# Patient Record
Sex: MALE | Race: OTHER | NOT HISPANIC OR LATINO | URBAN - METROPOLITAN AREA
[De-identification: names, ages, dates, MRNs, and addresses within clinical notes are randomized per-mention and may not be internally consistent; named-entity substitution may affect disease eponyms.]

---

## 2017-01-03 ENCOUNTER — OUTPATIENT (OUTPATIENT)
Dept: OUTPATIENT SERVICES | Facility: HOSPITAL | Age: 57
LOS: 1 days | End: 2017-01-03
Payer: MEDICAID

## 2017-01-03 VITALS
DIASTOLIC BLOOD PRESSURE: 87 MMHG | HEART RATE: 73 BPM | TEMPERATURE: 98 F | HEIGHT: 63 IN | RESPIRATION RATE: 16 BRPM | WEIGHT: 125 LBS | OXYGEN SATURATION: 99 % | SYSTOLIC BLOOD PRESSURE: 157 MMHG

## 2017-01-03 DIAGNOSIS — N43.3 HYDROCELE, UNSPECIFIED: ICD-10-CM

## 2017-01-03 DIAGNOSIS — F25.0 SCHIZOAFFECTIVE DISORDER, BIPOLAR TYPE: ICD-10-CM

## 2017-01-03 DIAGNOSIS — I10 ESSENTIAL (PRIMARY) HYPERTENSION: ICD-10-CM

## 2017-01-03 DIAGNOSIS — Z98.890 OTHER SPECIFIED POSTPROCEDURAL STATES: Chronic | ICD-10-CM

## 2017-01-03 DIAGNOSIS — Z01.818 ENCOUNTER FOR OTHER PREPROCEDURAL EXAMINATION: ICD-10-CM

## 2017-01-03 PROCEDURE — 86901 BLOOD TYPING SEROLOGIC RH(D): CPT

## 2017-01-03 PROCEDURE — 86900 BLOOD TYPING SEROLOGIC ABO: CPT

## 2017-01-03 PROCEDURE — 71046 X-RAY EXAM CHEST 2 VIEWS: CPT

## 2017-01-03 PROCEDURE — G0463: CPT

## 2017-01-03 PROCEDURE — 71020: CPT | Mod: 26

## 2017-01-03 PROCEDURE — 86850 RBC ANTIBODY SCREEN: CPT

## 2017-01-03 NOTE — H&P PST ADULT - FAMILY HISTORY
Mother  Still living? Unknown  Family history of cancer, Age at diagnosis: Age Unknown     Sibling  Still living? Yes, Estimated age: Age Unknown  Family history of cancer, Age at diagnosis: Age Unknown  Family history of breast cancer in sister, Age at diagnosis: Age Unknown

## 2017-01-03 NOTE — H&P PST ADULT - NEGATIVE CARDIOVASCULAR SYMPTOMS
no paroxysmal nocturnal dyspnea/no chest pain/no orthopnea/no palpitations/no peripheral edema/no dyspnea on exertion/no claudication

## 2017-01-03 NOTE — H&P PST ADULT - RS GEN PE MLT RESP DETAILS PC
no subcutaneous emphysema/no wheezes/clear to auscultation bilaterally/no rales/good air movement/normal/breath sounds equal/airway patent/respirations non-labored/no rhonchi/no intercostal retractions/no chest wall tenderness

## 2017-01-03 NOTE — H&P PST ADULT - PROBLEM SELECTOR PLAN 2
Instructed to take antihypertensive medications as prescribed with a sip of water the morning of surgery and follow up with PCP/Specialists post surgery. Agrees with plan of care

## 2017-01-03 NOTE — H&P PST ADULT - NSANTHOSAYNRD_GEN_A_CORE
No. KARINE screening performed.  STOP BANG Legend: 0-2 = LOW Risk; 3-4 = INTERMEDIATE Risk; 5-8 = HIGH Risk

## 2017-01-03 NOTE — H&P PST ADULT - HISTORY OF PRESENT ILLNESS
55 y/o male presents to PST with complains of right testicular swelling for 23 years and worsening over the past year. Patient was diagnosed with unspecified hydrocele and is scheduled for right hydrocelectomy 1/9/2017

## 2017-01-03 NOTE — H&P PST ADULT - PROBLEM SELECTOR PLAN 3
Instructed to take psyche medications the morning of surgery as prescribed with a sip of water and to follow up with psyche post surgery. Verbalized understanding of instructions

## 2017-01-03 NOTE — H&P PST ADULT - NEGATIVE GENERAL SYMPTOMS
no weight gain/no polyuria/no polydipsia/no chills/no fever/no malaise/no polyphagia/no weight loss/no sweating/no anorexia

## 2017-01-03 NOTE — H&P PST ADULT - GASTROINTESTINAL DETAILS
bowel sounds normal/no guarding/no distention/no organomegaly/soft/no bruit/no rebound tenderness/normal/no rigidity/no masses palpable/nontender

## 2017-01-03 NOTE — H&P PST ADULT - ASSESSMENT
57 y/o male with PMH of essential hypertension, leukocytosis, high risk for KARINE per STOP BANG screening test, tobacco use, mixed hyperlipidemia, schizoaffective bipolar disorder stable on medication, and unspecified hydrocele scheduled for right hydrocelectomy 1/9/2017. Patient is at moderate risk for planned surgery

## 2017-01-03 NOTE — H&P PST ADULT - NEGATIVE RESPIRATORY AND THORAX SYMPTOMS
no cough/no hemoptysis/no dyspnea/no pleuritic chest pain/no wheezing no cough/no pleuritic chest pain/no wheezing/no hemoptysis

## 2017-01-03 NOTE — H&P PST ADULT - PMH
Essential (primary) hypertension    Hepatitis C virus infection without hepatic coma, unspecified chronicity  treatment completed 2 years ago  Hyperlipidemia, unspecified hyperlipidemia type    Leukocytosis, unspecified type    Renal calculus  resolved 8/5/2016  Schizoaffective disorder, bipolar type

## 2017-01-03 NOTE — H&P PST ADULT - PROBLEM SELECTOR PLAN 1
Scheduled for right hydrocelectomy 1/9/2017. Preoperative instructions discussed and given to patient. Verbalized understanding of instructions

## 2017-01-09 ENCOUNTER — OUTPATIENT (OUTPATIENT)
Dept: OUTPATIENT SERVICES | Facility: HOSPITAL | Age: 57
LOS: 1 days | Discharge: HOME | End: 2017-01-09
Payer: MEDICAID

## 2017-01-09 VITALS
RESPIRATION RATE: 14 BRPM | SYSTOLIC BLOOD PRESSURE: 127 MMHG | HEART RATE: 63 BPM | OXYGEN SATURATION: 99 % | DIASTOLIC BLOOD PRESSURE: 65 MMHG | TEMPERATURE: 97 F

## 2017-01-09 VITALS
RESPIRATION RATE: 17 BRPM | TEMPERATURE: 98 F | DIASTOLIC BLOOD PRESSURE: 83 MMHG | SYSTOLIC BLOOD PRESSURE: 153 MMHG | OXYGEN SATURATION: 97 % | WEIGHT: 125 LBS | HEIGHT: 63 IN | HEART RATE: 78 BPM

## 2017-01-09 DIAGNOSIS — Z98.890 OTHER SPECIFIED POSTPROCEDURAL STATES: Chronic | ICD-10-CM

## 2017-01-09 DIAGNOSIS — N43.3 HYDROCELE, UNSPECIFIED: ICD-10-CM

## 2017-01-09 DIAGNOSIS — Z01.818 ENCOUNTER FOR OTHER PREPROCEDURAL EXAMINATION: ICD-10-CM

## 2017-01-09 PROCEDURE — 88302 TISSUE EXAM BY PATHOLOGIST: CPT

## 2017-01-09 PROCEDURE — 55040 REMOVAL OF HYDROCELE: CPT | Mod: RT

## 2017-01-09 PROCEDURE — 88302 TISSUE EXAM BY PATHOLOGIST: CPT | Mod: 26

## 2017-01-09 RX ORDER — RISPERIDONE 4 MG/1
1 TABLET ORAL
Qty: 0 | Refills: 0 | COMMUNITY

## 2017-01-09 RX ORDER — SERTRALINE 25 MG/1
1 TABLET, FILM COATED ORAL
Qty: 0 | Refills: 0 | COMMUNITY

## 2017-01-09 RX ORDER — HYDROMORPHONE HYDROCHLORIDE 2 MG/ML
0.5 INJECTION INTRAMUSCULAR; INTRAVENOUS; SUBCUTANEOUS ONCE
Qty: 0 | Refills: 0 | Status: DISCONTINUED | OUTPATIENT
Start: 2017-01-09 | End: 2017-01-09

## 2017-01-09 RX ORDER — SODIUM CHLORIDE 9 MG/ML
3 INJECTION INTRAMUSCULAR; INTRAVENOUS; SUBCUTANEOUS EVERY 8 HOURS
Qty: 0 | Refills: 0 | Status: DISCONTINUED | OUTPATIENT
Start: 2017-01-09 | End: 2017-01-09

## 2017-01-09 RX ORDER — LOSARTAN POTASSIUM 100 MG/1
1 TABLET, FILM COATED ORAL
Qty: 0 | Refills: 0 | COMMUNITY

## 2017-01-09 RX ORDER — DIVALPROEX SODIUM 500 MG/1
1 TABLET, DELAYED RELEASE ORAL
Qty: 0 | Refills: 0 | COMMUNITY

## 2017-01-09 RX ORDER — OXYCODONE HYDROCHLORIDE 5 MG/1
1 TABLET ORAL
Qty: 12 | Refills: 0 | OUTPATIENT
Start: 2017-01-09

## 2017-01-09 RX ORDER — AMLODIPINE BESYLATE 2.5 MG/1
1 TABLET ORAL
Qty: 0 | Refills: 0 | COMMUNITY

## 2017-01-09 NOTE — ASU DISCHARGE PLAN (ADULT/PEDIATRIC). - NURSING INSTRUCTIONS
keep dressing dry, clean, intact, for 2 days. After 2 days, remove dressing and leave steri strips on. You can shower with steri strips on. If it fall off after shower is OK, if not you leave it there, it will fall off by itself in 2-3 days.

## 2017-01-09 NOTE — ASU DISCHARGE PLAN (ADULT/PEDIATRIC). - NOTIFY
Fever greater than 101/Bleeding that does not stop/Pain not relieved by Medications/Unable to Urinate/Persistent Nausea and Vomiting Persistent Nausea and Vomiting/Swelling that continues/Pain not relieved by Medications/Numbness, color, or temperature change to extremity/Fever greater than 101/Bleeding that does not stop/Unable to Urinate

## 2017-01-09 NOTE — ASU DISCHARGE PLAN (ADULT/PEDIATRIC). - MEDICATION SUMMARY - MEDICATIONS TO TAKE
I will START or STAY ON the medications listed below when I get home from the hospital:    acetaminophen-oxyCODONE 325 mg-5 mg oral tablet  -- 1 tab(s) by mouth every 6 hours, As Needed, Moderate Pain (4 - 6) MDD:4  -- Indication: For pain    losartan 50 mg oral tablet  -- 1 tab(s) by mouth once a day  -- Indication: For as directed    Depakote 250 mg oral delayed release tablet  -- 1 tab(s) by mouth 2 times a day  -- Indication: For as directed    Zoloft 50 mg oral tablet  -- 1 tab(s) by mouth once a day  -- Indication: For as directed    RisperDAL 2 mg oral tablet  -- 1 tab(s) by mouth 2 times a day  -- Indication: For as directed    amLODIPine 5 mg oral tablet  -- 1 tab(s) by mouth once a day  -- Indication: For as directed

## 2017-01-12 DIAGNOSIS — N43.3 HYDROCELE, UNSPECIFIED: ICD-10-CM

## 2017-01-12 DIAGNOSIS — F17.210 NICOTINE DEPENDENCE, CIGARETTES, UNCOMPLICATED: ICD-10-CM

## 2017-01-12 DIAGNOSIS — F32.9 MAJOR DEPRESSIVE DISORDER, SINGLE EPISODE, UNSPECIFIED: ICD-10-CM

## 2017-01-12 DIAGNOSIS — F25.9 SCHIZOAFFECTIVE DISORDER, UNSPECIFIED: ICD-10-CM

## 2017-01-12 DIAGNOSIS — I10 ESSENTIAL (PRIMARY) HYPERTENSION: ICD-10-CM

## 2017-01-12 DIAGNOSIS — B18.2 CHRONIC VIRAL HEPATITIS C: ICD-10-CM

## 2017-01-12 DIAGNOSIS — E78.5 HYPERLIPIDEMIA, UNSPECIFIED: ICD-10-CM

## 2021-01-11 NOTE — H&P PST ADULT - DATE/TIME OF ACCEPTANCE
Apply vashe-moist gauze packing followed by dry cover dressing to be changed daily. Follow-up in wound care clinic in 3 weeks. Patient's caregiver updated on change in wound care plan. Caregiver verbalized that facility has adequate wound care supplies. Patient sent with extra supplies from today's visit (1 bottle of vashe and 1 bottle of packing strips).    Writer has taken care of this patient today during their wound care visit  with the assistance of Claudia Pastrana RN.      
03-Jan-2017 04:45
